# Patient Record
Sex: FEMALE | Race: WHITE | NOT HISPANIC OR LATINO | URBAN - METROPOLITAN AREA
[De-identification: names, ages, dates, MRNs, and addresses within clinical notes are randomized per-mention and may not be internally consistent; named-entity substitution may affect disease eponyms.]

---

## 2017-01-09 ENCOUNTER — IMPORTED ENCOUNTER (OUTPATIENT)
Dept: URBAN - METROPOLITAN AREA CLINIC 31 | Facility: CLINIC | Age: 82
End: 2017-01-09

## 2017-01-09 PROBLEM — H25.11: Noted: 2017-01-09

## 2017-01-09 PROBLEM — Z96.1: Noted: 2017-01-09

## 2017-01-09 PROBLEM — H16.233: Noted: 2017-01-09

## 2017-01-09 PROBLEM — H40.11X3: Noted: 2017-01-09

## 2017-01-09 PROCEDURE — 99213 OFFICE O/P EST LOW 20 MIN: CPT

## 2017-01-09 NOTE — PATIENT DISCUSSION
1.  Neurotrophic keratitis with recurrent erosions OU Better on ointment currently no pain or discomfort. Tears 3x/d Emycin silke 2x/d chronic. if worsens would add serum tears. 2. Primary Open Angle Glaucoma OU - Continue with current treatment plan. Discussed importance of compliance. Sees Dr Garrett Herron for treatment. 3. Pseudophakia OS - IOL stable. Monitor. 4. Nuclear Sclerotic Cataract OD: f/u Dr Janeal Severance. Diabetes Dr Garrett Herron monitors retina. Return for an appointment in 4 months for office call. with Dr. Roc Tapia.

## 2017-04-26 ENCOUNTER — IMPORTED ENCOUNTER (OUTPATIENT)
Dept: URBAN - METROPOLITAN AREA CLINIC 31 | Facility: CLINIC | Age: 82
End: 2017-04-26

## 2017-04-26 PROBLEM — H16.233: Noted: 2017-04-26

## 2017-04-26 PROBLEM — Z96.1: Noted: 2017-04-26

## 2017-04-26 PROBLEM — H25.11: Noted: 2017-04-26

## 2017-04-26 PROBLEM — H40.11X3: Noted: 2017-04-26

## 2017-04-26 PROCEDURE — 99213 OFFICE O/P EST LOW 20 MIN: CPT

## 2017-04-26 NOTE — PATIENT DISCUSSION
1.  Neurotrophic keratitis with recurrent erosions OU Resolved on ointment currently no pain or discomfort. Tears 3x/d Emycin silke 1x/d chronic. if worsens would add serum tears. Surface has improved but will never be 100% normal from poor sensation. 2. Primary Open Angle Glaucoma OU - Continue with current treatment plan. Discussed importance of compliance. Sees Dr Caitie Lewis for treatment. 3. Pseudophakia OS - IOL stable. Monitor. 4. Nuclear Sclerotic Cataract OD: f/u Dr Caitie Lewis for surgery in a month. 5. Diabetes Dr Caitie Lewis monitors retina.

## 2022-04-02 ASSESSMENT — VISUAL ACUITY
OS_SC: 20/80-1
OS_CC: 20/200
OD_SC: CF@2'
OD_CC: CF@3'